# Patient Record
Sex: FEMALE | Race: WHITE | NOT HISPANIC OR LATINO
[De-identification: names, ages, dates, MRNs, and addresses within clinical notes are randomized per-mention and may not be internally consistent; named-entity substitution may affect disease eponyms.]

---

## 2019-10-14 ENCOUNTER — RECORD ABSTRACTING (OUTPATIENT)
Age: 34
End: 2019-10-14

## 2019-10-14 VITALS — BODY MASS INDEX: 21.26 KG/M2 | WEIGHT: 120 LBS

## 2019-10-14 VITALS — HEIGHT: 63 IN

## 2019-10-14 DIAGNOSIS — Z87.19 PERSONAL HISTORY OF OTHER DISEASES OF THE DIGESTIVE SYSTEM: ICD-10-CM

## 2019-10-14 DIAGNOSIS — Z78.9 OTHER SPECIFIED HEALTH STATUS: ICD-10-CM

## 2019-10-15 ENCOUNTER — APPOINTMENT (OUTPATIENT)
Dept: SURGERY | Facility: CLINIC | Age: 34
End: 2019-10-15
Payer: COMMERCIAL

## 2019-10-15 VITALS — RESPIRATION RATE: 20 BRPM | HEART RATE: 80 BPM | SYSTOLIC BLOOD PRESSURE: 110 MMHG | DIASTOLIC BLOOD PRESSURE: 60 MMHG

## 2019-10-15 DIAGNOSIS — K57.32 DIVERTICULITIS OF LARGE INTESTINE W/OUT PERFORATION OR ABSCESS W/OUT BLEEDING: ICD-10-CM

## 2019-10-15 PROCEDURE — 99214 OFFICE O/P EST MOD 30 MIN: CPT

## 2019-10-15 NOTE — PLAN
[FreeTextEntry1] : She will decide on surgery. \par ERAS protocol the week before.\par Liquid diet, MiraLAX and PO abx the day before surgery as per bowel cleanout protocol

## 2019-10-15 NOTE — PHYSICAL EXAM
[Normal Breath Sounds] : Normal breath sounds [Alert] : alert [Normal Heart Sounds] : normal heart sounds [Oriented to Place] : oriented to place [Oriented to Person] : oriented to person [Oriented to Time] : oriented to time [Calm] : calm [de-identified] : NAD [de-identified] : soft, minimal tenderness to deep palpation LLQ,

## 2019-10-15 NOTE — ASSESSMENT
[FreeTextEntry1] : chronic diverticulitis with worsening acute episodes. no perforation but since her symptoms are crescendoing, a laparoscopic sigmoidectomy is recommended. There is a window of opportunity now since her symptoms are quiescent. \par Risks of recurrence are hgh with potential for further complications of perforation. \par The risks benefits and alternatives were discussed and the patient will think about her options. \par Lap sigmoidectomy with possible colostomy was described. ERAS protocol also discussed and planned. .\par \par

## 2019-10-15 NOTE — CONSULT LETTER
[Dear  ___] : Dear  [unfilled], [Courtesy Letter:] : I had the pleasure of seeing your patient, [unfilled], in my office today. [Please see my note below.] : Please see my note below. [FreeTextEntry1] : Hayden Garay- I saw Brian Palmer today for follow up.  I encouraged her to think about an elective sigmoid resection. She is deciding and will let me know. She is healthy but may see you for preop visit.

## 2019-10-15 NOTE — HISTORY OF PRESENT ILLNESS
[de-identified] : the patient presents for further evaluation and discussion regarding recurrent diverticulitis. she is s/p a prolonged hospitalization over a week ago when she was admitted with acute diverticulitis without perforation . She was treated conservatively with IV abx and bowel rest. She had a lot of pain that took a while before it resolved. She is feeling better only having some mild discomfort in the LLQ  described as a crampy intermittent pain. \par She is tolerating po but on a soft diet and becomes full quickly. she has soft bowel movements . She is back to work but fatigues sooner in the day. \par no episodes of N/V/ diarrhea or constipation.

## 2019-10-22 ENCOUNTER — OTHER (OUTPATIENT)
Age: 34
End: 2019-10-22

## 2019-10-30 ENCOUNTER — MESSAGE (OUTPATIENT)
Age: 34
End: 2019-10-30

## 2019-10-30 ENCOUNTER — RECORD ABSTRACTING (OUTPATIENT)
Age: 34
End: 2019-10-30

## 2019-10-30 RX ORDER — METRONIDAZOLE 500 MG/1
500 TABLET, FILM COATED ORAL
Refills: 0 | Status: ACTIVE | COMMUNITY

## 2019-10-30 RX ORDER — NEOMYCIN SULFATE 500 MG/1
TABLET ORAL
Refills: 0 | Status: ACTIVE | COMMUNITY

## 2019-10-31 ENCOUNTER — MESSAGE (OUTPATIENT)
Age: 34
End: 2019-10-31

## 2019-11-01 ENCOUNTER — MESSAGE (OUTPATIENT)
Age: 34
End: 2019-11-01

## 2019-11-03 ENCOUNTER — MESSAGE (OUTPATIENT)
Age: 34
End: 2019-11-03

## 2019-11-04 ENCOUNTER — APPOINTMENT (OUTPATIENT)
Dept: SURGERY | Facility: HOSPITAL | Age: 34
End: 2019-11-04
Payer: COMMERCIAL

## 2019-11-04 PROCEDURE — 44207 L COLECTOMY/COLOPROCTOSTOMY: CPT

## 2019-11-04 PROCEDURE — 44213 LAP MOBIL SPLENIC FL ADD-ON: CPT

## 2019-11-11 ENCOUNTER — OTHER (OUTPATIENT)
Age: 34
End: 2019-11-11

## 2019-11-14 ENCOUNTER — APPOINTMENT (OUTPATIENT)
Dept: SURGERY | Facility: CLINIC | Age: 34
End: 2019-11-14
Payer: COMMERCIAL

## 2019-11-14 VITALS
WEIGHT: 120 LBS | BODY MASS INDEX: 21.26 KG/M2 | HEIGHT: 63 IN | SYSTOLIC BLOOD PRESSURE: 117 MMHG | DIASTOLIC BLOOD PRESSURE: 74 MMHG | HEART RATE: 102 BPM

## 2019-11-14 PROCEDURE — 99024 POSTOP FOLLOW-UP VISIT: CPT

## 2019-11-14 NOTE — ASSESSMENT
[FreeTextEntry1] : Patient here for post op check from lap sigmoidectomy 11/4/19. Doing well. Main  complaint is a chronic low grade nausea but improved from postop period. Is guido; po but eating remains limited. No diarrhea. . Wounds clean and dry.  Will monitor over the next month. If not improved will return for re-evaluation and consider GI consult for other causes of chronic nausea. However i believe this is still related to the surgery  and reasonable within this 6-8 week postop window. Her path was reviewed and shoed chronic diverticulitis with an intramural abscess still.  \par

## 2021-04-07 ENCOUNTER — APPOINTMENT (OUTPATIENT)
Dept: SURGERY | Facility: CLINIC | Age: 36
End: 2021-04-07

## 2021-04-13 ENCOUNTER — APPOINTMENT (OUTPATIENT)
Dept: SURGERY | Facility: CLINIC | Age: 36
End: 2021-04-13